# Patient Record
(demographics unavailable — no encounter records)

---

## 2025-01-17 NOTE — DISCUSSION/SUMMARY
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  She landed when she fell on concrete.  She had a jerking injury to the neck.  She has numbness and tingling down the both arms.  No fevers or chills.  No issues with gait or balance bowel bladder incontinence.  She has no issues of fine motor activity.  Pt is NAD, AAOX3 skin is intact no palpable stepoff 5/5 motor strength BUE SILT C5-T1 BUE POSITIVE SPURLING Negative sweet normal rapid  test equal reflexes bicep/BR/tricep hand wwp bcr  Imaging X-rays cervical spine 2 views which I personally reviewed demonstrate C4-C7 DDD.  Treatment note I discussed at length with the patient nature of the condition.  The patient presents with neck pain bilateral arm radiculopathy with numbness and tingling in the setting of a C4-C7 DDD.  We will get an MRI.  Will provide with Soma and diclofenac, we discussed the side effects of both drugs including sedation and GI upset.  She will continue with PT.  I will see her after the studies are completed.  All the patient's questions were sought and answer the next visit will be a televisit as she lives about 2 hours away.  She will mail us the CD

## 2025-02-10 NOTE — DISCUSSION/SUMMARY
[de-identified] : Chief complaint Follow-up  HPI Patient is a very pleasant 66-year-old here for follow-up of her neck pain.  She continues to have neck pain with difficulty moving her arms.  She has numbness and tingling down the arms.  She has been awaiting physical therapy has not been approved with [employer]er's [employer].  She has no issues with gait or balance bowel bladder incontinence.  She is taking the diclofenac.  No fevers or chills.  Pain score is 10 out of 10  Pt is NAD, AAOX3 skin is intact no palpable stepoff 5/5 motor strength BUE SILT C5-T1 BUE POSITIVE SPURLING Negative sweet normal rapid  test equal reflexes bicep/BR/tricep hand wwp bcr  Imaging Review of cervical MRI demonstrate C4-C7 DDD facet arthropathy disc ossified complex with moderate neuroforaminal stenosis most pronounced at C5-C6 and C6-C7 there is no fracture  Treatment note I discussed at length with the patient into the condition.  Today's visit is performed remotely.  The patient is presenting with persistent neck pain and bilateral arm symptoms with numbness and tingling.  Her MRI demonstrates stenosis from C5-C7.  At this time we will recommend continued physical therapy.  She can return back to work.  We will touch base in 6 weeks.  The symptoms persist, she is a candidate for a epidural.  All the patient's questions were sought and answered.  She has not reached max medical improvement.

## 2025-05-21 NOTE — DISCUSSION/SUMMARY
[de-identified] :   Chief complaint Follow-up  HPI The patient is here today for follow-up of her neck pain.  She continues to have neck pain after a work-related injury.  She has neck pain rating rating to the bilateral shoulders.  She has neck pain with range of motion.  She is currently working.  She is going physical therapy for the better part but still has not gotten therapy for the cervical spine.  The patient does endorse that she complained about the neck pain at the index [employer]er's [employer]. and visitation.  Pt is NAD, AAOX3 skin is intact no palpable stepoff 5/5 motor strength BUE SILT C5-T1 BUE POSITIVE SPURLING Negative sweet normal rapid  test equal reflexes bicep/BR/tricep hand wwp bcr  Imaging MRI of the cervical spine which was taken on January 31, 2025 demonstrates degenerative changes at C3-C7 with moderate stenosis at C6-C7 C5-C6 and C4-C5.  Treatment note I discussed at length with the patient into the condition.  The patient continues to have neck pain shoulder pain in the setting of multilevel cervical spondylosis and stenosis.  The patient continues to have difficulty with her neck pain.  She is currently working.  We will recommend that she get the cervical PT and if it does not improve her symptoms to be a candidate for injections and possible surgery.  All the patient's questions were sought and answered.